# Patient Record
Sex: FEMALE
[De-identification: names, ages, dates, MRNs, and addresses within clinical notes are randomized per-mention and may not be internally consistent; named-entity substitution may affect disease eponyms.]

---

## 2018-09-16 ENCOUNTER — HOSPITAL ENCOUNTER (EMERGENCY)
Dept: HOSPITAL 68 - ERH | Age: 68
End: 2018-09-16
Payer: COMMERCIAL

## 2018-09-16 VITALS — HEIGHT: 61 IN | WEIGHT: 170 LBS | BODY MASS INDEX: 32.1 KG/M2

## 2018-09-16 VITALS — SYSTOLIC BLOOD PRESSURE: 113 MMHG | DIASTOLIC BLOOD PRESSURE: 69 MMHG

## 2018-09-16 DIAGNOSIS — F32.9: ICD-10-CM

## 2018-09-16 DIAGNOSIS — J44.1: Primary | ICD-10-CM

## 2018-09-16 DIAGNOSIS — Z87.891: ICD-10-CM

## 2018-09-16 DIAGNOSIS — K27.9: ICD-10-CM

## 2018-09-16 DIAGNOSIS — J45.909: ICD-10-CM

## 2018-09-16 LAB
ABSOLUTE GRANULOCYTE CT: 13.6 /CUMM (ref 1.4–6.5)
BASOPHILS # BLD: 0 /CUMM (ref 0–0.2)
BASOPHILS NFR BLD: 0 % (ref 0–2)
EOSINOPHIL # BLD: 0 /CUMM (ref 0–0.7)
EOSINOPHIL NFR BLD: 0.2 % (ref 0–5)
ERYTHROCYTE [DISTWIDTH] IN BLOOD BY AUTOMATED COUNT: 14.1 % (ref 11.5–14.5)
GRANULOCYTES NFR BLD: 88.6 % (ref 42.2–75.2)
HCT VFR BLD CALC: 41 % (ref 37–47)
LYMPHOCYTES # BLD: 0.8 /CUMM (ref 1.2–3.4)
MCH RBC QN AUTO: 30 PG (ref 27–31)
MCHC RBC AUTO-ENTMCNC: 33.2 G/DL (ref 33–37)
MCV RBC AUTO: 90.4 FL (ref 81–99)
MONOCYTES # BLD: 0.9 /CUMM (ref 0.1–0.6)
PLATELET # BLD: 249 /CUMM (ref 130–400)
PMV BLD AUTO: 8.3 FL (ref 7.4–10.4)
RED BLOOD CELL CT: 4.53 /CUMM (ref 4.2–5.4)
WBC # BLD AUTO: 15.4 /CUMM (ref 4.8–10.8)

## 2018-09-16 NOTE — ED GENERAL ADULT
History of Present Illness
 
General
Chief Complaint: Upper Respiratory Sx/Fever
Stated Complaint: ?UPP RESP INFECTION
Source: patient
Exam Limitations: no limitations
 
Vital Signs & Intake/Output
Vital Signs & Intake/Output
 Vital Signs
 
 
Date Time Temp Pulse Resp B/P B/P Pulse O2 O2 Flow FiO2
 
     Mean Ox Delivery Rate 
 
 1426 98.6 74 18 113/69  99 Room Air  
 
 1424 98.9        
 
 1310      95   
 
 1246      95 Room Air  
 
 1141 98.7 90 20 127/81  97 Room Air  
 
 
 ED Intake and Output
 
 
  0000  1200
 
Intake Total 0 
 
Output Total  
 
Balance 0 
 
   
 
Intake, Oral 0 
 
Patient  170 lb
 
Weight  
 
Weight  Estimated
 
Measurement  
 
Method  
 
 
 
Allergies
Coded Allergies:
No Known Allergies (17)
 
Reconcile Medications
Albuterol Sulfate (Proair Hfa) 90 MCG HFA.AER.AD   2 PUF INH Q4-6 PRN PRN 
SHORTNESS OF BREATH  (Reported)
Amitriptyline HCl 25 MG TABLET   1 TAB PO QPM UNKNOWN  (Reported)
Azithromycin (Zithromax) 250 MG TABLET   1 DP PO AD COPD
     2 the first day followed by 1 for days 2-5
Budesonide/Formoterol Fumarate (Symbicort 160-4.5 Mcg Inhaler) 160 MCG-4.5 MCG/
ACTUATION HFA.AER.AD   2 PUF INH BID BREATHING PROBLEMS  (Reported)
Bupropion HCl (Bupropion HCl Sr) 100 MG TABLET.ER   1 TAB PO BID MENTAL HEALTH  
(Reported)
Codeine Phosphate/Guaifenesi (Cheratussin AC Syrup) 10 MG-100 MG/5 ML LIQUID   
10 ML PO Q6H PRN COUGH
Eletriptan HBr (Relpax) 20 MG TABLET   1 TAB PO DAILY PRN HEADACHE  (Reported)
Folic Acid 1 MG TABLET   1 TAB PO DAILY VITAMIN SUPPORT  (Reported)
Mirtazapine 45 MG TABLET   1 TAB PO QPM SLEEP  (Reported)
Prednisone 50 MG TABLET   1 TAB PO DAILY COPD
Prednisone 10 MG TABLET   0 PO DAILY Breathing
Quetiapine Fumarate (Seroquel) 100 MG TABLET   1 TAB PO QPM SLEEP  (Reported)
Quetiapine Fumarate 25 MG TABLET   1 TAB PO DAILY PSYCH  (Reported)
Venlafaxine HCl (Venlafaxine HCl ER) 150 MG CAP.ER.24H   1 CAP PO BID MENTAL 
HEALTH  (Reported)
 
Triage Note:
PT TO ED C/O COUGH WITH CHEST CONGESTION SINCE
YESTERDAY. PRODUCTIVE COUGH WITH YELLOW SPUTUM.
AFEBRILE. ALSO C/O HEADACHE.
Triage Nurses Notes Reviewed? yes
Onset: Abrupt
Duration: day(s): (2), constant, continues in ED, getting worse
Timing: recent history
Injury Environment: home
Severity: moderate, severe
No Modifying Factors: none
Associated Symptoms: cough
LMP (ages 10-50): post menopausal, unknown
Pregnant: No
Patient currently breastfeeds: No
HPI:
67-year-old female history of asthma and COPD presents for evaluation of cough, 
congestion and wheezing.  Patient reports symptoms started 2 days ago and been 
persistent.  She reports a cough productive of yellow sputum.  She's been using 
her albuterol inhaler without much improvement.  She recently quit smoking.  She
denies chest pain hemoptysis lower extremity edema dizziness lightheadedness 
syncope.  No recent surgery or recent trauma no history of PE.
(Fredrick Mena)
 
Past History
 
Travel History
Traveled to [place] past 21 day No
 
Medical History
Any Pertinent Medical History? see below for history
Neurological: migraine
EENT: NONE
Cardiovascular: NONE
Respiratory: asthma, COPD
Gastrointestinal: peptic ulcer disease
Hepatic: NONE
Renal: NONE
Musculoskeletal: osteoarthritis
Psychiatric: depression
Endocrine: NONE
Blood Disorders: NONE
Cancer(s): NONE
GYN/Reproductive: NONE
History of MRSA: No
History of VRE: No
History of CDIFF: No
 
Surgical History
Surgical History: GASTRECTOMY 
 
Psychosocial History
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
What is your primary language Bulgarian
Tobacco Use: Quit >30 days ago
ETOH Use: denies use
Illicit Drug Use: denies illicit drug use
 
Family History
Family History, If Any:
MOTHER
  FH: diabetes mellitus
SISTER
  FH: diabetes mellitus
BROTHER
  FH: diabetes mellitus
 
Hx Contributory? No
(Fredrick Mena)
 
Review of Systems
 
Review of Systems
Constitutional:
Reports: no symptoms. 
EENTM:
Reports: no symptoms. 
Respiratory:
Reports: see HPI, cough, short of breath, sputum production, wheezing. 
Cardiovascular:
Reports: no symptoms. 
GI:
Reports: no symptoms. 
Genitourinary:
Reports: no symptoms. 
Musculoskeletal:
Reports: no symptoms. 
Skin:
Reports: no symptoms. 
Neurological/Psychological:
Reports: no symptoms. 
Hematologic/Endocrine:
Reports: no symptoms. 
Immunologic/Allergic:
Reports: no symptoms. 
All Other Systems: Reviewed and Negative
(Fredrick Mena)
 
Physical Exam
 
Physical Exam
General Appearance: well developed/nourished, no apparent distress, alert, awake
Head: atraumatic, normal appearance
Eyes:
Bilateral: normal appearance, EOMI. 
Ears, Nose, Throat: normal pharynx, normal ENT inspection, hearing grossly 
normal
Neck: normal inspection, supple, full range of motion
Respiratory: chest non-tender, no respiratory distress, rhonchi, wheezing
Cardiovascular: regular rate/rhythm, normal peripheral pulses
Peripheral Pulses:
2+ radial (R), 2+ radial (L)
Gastrointestinal: soft, non-tender
Back: normal inspection, normal range of motion, no vertebral tenderness
Extremities: normal inspection, normal range of motion, no edema
Neurologic/Psych: no motor/sensory deficits, awake, alert, oriented x 3, normal 
gait, normal mood/affect
Skin: intact, normal color, warm/dry
Lymphatic: no anterior cervical renato
 
Core Measures
ACS in differential dx? No
CVA/TIA Diagnosis: No
Sepsis Present: No
Sepsis Focused Exam Completed? No
(Nnamdi KENNEY,Fredrick)
 
Progress
Differential Diagnoses
I considered the following diagnoses in my evaluation of the patient: [COPD 
exacerbation, asthma exacerbation, pneumonia, bronchitis, PE]
 
Plan of Care:
 Orders
 
 
Procedure Date/time Status
 
TROPONIN LEVEL  1305 Complete
 
COMPREHENSIVE METABOLIC PANEL  1305 Complete
 
CBC WITHOUT DIFFERENTIAL  1305 Complete
 
EKG  1305 Active
 
 
 Laboratory Tests
 
 
 
18 1320:
Anion Gap 12, Estimated GFR > 60, BUN/Creatinine Ratio 11.7, Glucose 111  H, 
Calcium 9.9, Total Bilirubin 0.8, AST 36, ALT 20, Alkaline Phosphatase 86, 
Troponin I < 0.01, Total Protein 7.1, Albumin 4.2, Globulin 2.9, Albumin/
Globulin Ratio 1.4, CBC w Diff MAN DIFF ORDERED, RBC 4.53, MCV 90.4, MCH 30.0, 
MCHC 33.2, RDW 14.1, MPV 8.3, Gran % 88.6  H, Lymphocytes % 5.2  L, Monocytes % 
6.0, Eosinophils % 0.2, Basophils % 0, Absolute Granulocytes 13.6  H, Absolute 
Lymphocytes 0.8  L, Absolute Monocytes 0.9  H, Absolute Eosinophils 0, Absolute 
Basophils 0, Platelet Estimate VERIFIED BY SMEAR, Normocytic RBCs VERIFIED, 
Normochromic RBCs VERIFIED
 
 
Patient is here for evaluation of cough congestion wheezing and shortness of 
breath.  On exam she has diffuse wheezing and rhonchi.  No hypoxia or cyanosis. 
Patient was medicated with prednisone and a DuoNeb.  Chest x-ray labs EKG 
ordered.
 
Patient is feeling better after DuoNeb.  Chest x-ray is clear.  Still no hypoxia
or cyanosis.  Lab work shows a white count of 15,000.  Negative troponin EKG 
sinus rhythm without ST or T-wave changes.  Patient will be treated for COPD 
exacerbation with Zithromax prednisone albuterol and Cheratussin.  Advised her 
to rest and plenty of fluids follow-up with pulmonology.  Discussed return 
precautions in detail case discussed with Dr. Arenas he agrees
Diagnostic Imaging:
Viewed by Me: Radiology Read.  Discussed w/RAD: Radiology Read. 
CXR Impression: PATIENT: DIYA SORENSEN  MEDICAL RECORD NO: [id number] PRESENT AGE:
67  PATIENT ACCOUNT NO: [id number] : [birth year]  LOCATION: [facility] ORDERING PHYSICIAN:
Fredrick KENNEY     SERVICE DATE:  EXAM TYPE: RAD - XRY-CHEST XRAY, 
TWO VIEWS EXAMINATION: XR CHEST CLINICAL INFORMATION: Cough and fever. 
COMPARISON: Several priors. Most recent of 17. TECHNIQUE: 2 views of the 
chest were obtained. FINDINGS: No significant abnormality is noted involving the
heart, lungs, mediastinum, bony thorax or soft tissues. No focal consolidation 
or other abnormality is demonstrated. Calcific atherosclerotic disease is 
present in the aortic arch. There is posttraumatic deformity of the distal right
clavicle unchanged. IMPRESSION: Unremarkable examination. No acute abnormality. 
DICTATED BY: NICOLE Beebe MD  DATE/TIME DICTATED:18 
:RAD[facility]  DATE/TIME TRANSCRIBED:18 CONFIDENTIAL, 
DO NOT COPY WITHOUT APPROPRIATE AUTHORIZATION.  <Electronically signed in Other 
Vendor System>                                                                  
                     SIGNED BY: NICOLE Beebe MD 18 1238
Initial ED EKG: normal sinus rhythm, no ST T wave changes
Prior EKG: unchanged
(Fredrick Mena)
 
Departure
 
Departure
Disposition: HOME OR SELF CARE
Condition: Stable
Clinical Impression
Primary Impression: COPD exacerbation
Referrals:
Lazaro Hoffmann MD (PCP/Family)
 
Additional Instructions:
Take antibiotics and steroids as directed for the full course.  Continue to use 
inhalers as directed.  Tylenol for pain.  Cheratussin as needed for cough this 
may cause drowsiness.  Make a follow-up with your primary care doctor to review 
all results of today's visit.  Monitor your symptoms return with any concerns.
 
 
Please go over all results of today's visit with your primary care doctor.  
Contact your primary care doctor to let them know you were here in the emergency
room.  There may be nonspecific findings which may not be related to your visit 
today here in the emergency room but may require further evaluation and chronic 
monitoring by your primary care doctor.  If you had a laceration today the 
chance of foreign body always remains. You should follow-up with your primary 
care doctor for recheck in 3-5 days for a wound check.  If you had an x-ray done
there is a chance that a fracture could have been missed on initial read and you
should follow-up with your primary care doctor for repeat x-rays if symptoms 
persist.  If your blood pressure was elevated here in the emergency room please 
have rechecked by her primary care doctor within the next 48 hours by your 
primary care doctor.  If you were prescribed a narcotic here in the emergency 
room or any type of controlled substances you're not allowed to drive while 
taking this medication or operate any type of heavy machinery.  Narcotics can 
make you feel lightheaded dizziness nausea and can cause constipation.  You may 
need to  a stool softener.  Thank you for choosing [facility] 
emergency room.  Please return to the emergency room immediately if you have any
other concerns worsening of symptoms.
Departure Forms:
Customer Survey
General Discharge Information
Prescriptions:
Current Visit Scripts
Prednisone 1 TAB PO DAILY  
     #5 TAB 
 
Codeine Phosphate/Guaifenesi (Cheratussin AC Syrup) 10 ML PO Q6H PRN COUGH 
     #240 ML 
 
Azithromycin (Zithromax) 1 DP PO AD  
     #6 TAB 
     2 the first day followed by 1 for days 2-5
 
 
(Fredrick Mena)
 
PA/NP Co-Sign Statement
Statement:
ED Attending supervision documentation-
 
[X] I saw and evaluated the patient. I have also reviewed all the pertinent lab 
results and diagnostic results. I agree with the findings and the plan of care 
as documented in the PA's/NP's documentation. 
 
[] I have reviewed the ED Record and agree with the PA's/NP's documentation.
 
[] Additions or exceptions (if any) to the PAs/NP's note and plan are 
summarized below:
[]
 
(Deepa DONOHUE,Arvind BOLES)
 
Critical Care Note
 
Critical Care Note
Critical Care Time: non-applicable
(Black PA,Fredrick)

## 2018-09-16 NOTE — RADIOLOGY REPORT
EXAMINATION:
XR CHEST
 
CLINICAL INFORMATION:
Cough and fever.
 
COMPARISON:
Several priors. Most recent of 12/18/17.
 
TECHNIQUE:
2 views of the chest were obtained.
 
FINDINGS:
No significant abnormality is noted involving the heart, lungs, mediastinum,
bony thorax or soft tissues. No focal consolidation or other abnormality is
demonstrated. Calcific atherosclerotic disease is present in the aortic arch.
There is posttraumatic deformity of the distal right clavicle unchanged.
 
IMPRESSION:
Unremarkable examination. No acute abnormality.